# Patient Record
Sex: FEMALE | Race: WHITE
[De-identification: names, ages, dates, MRNs, and addresses within clinical notes are randomized per-mention and may not be internally consistent; named-entity substitution may affect disease eponyms.]

---

## 2020-12-11 ENCOUNTER — HOSPITAL ENCOUNTER (OUTPATIENT)
Dept: HOSPITAL 39 - RAD | Age: 65
End: 2020-12-11
Attending: FAMILY MEDICINE
Payer: MEDICARE

## 2020-12-11 DIAGNOSIS — R91.8: ICD-10-CM

## 2020-12-11 DIAGNOSIS — I70.0: ICD-10-CM

## 2020-12-11 DIAGNOSIS — U07.1: Primary | ICD-10-CM

## 2021-12-01 NOTE — RAD
Both Dr. Brunner and Dr. Fuller are booking out at least 4-6 weeks. Left detailed message to call back. Writer would refer to Lizzie if pt agrees.    EXAM DESCRIPTION: Chest,2 Views



CLINICAL HISTORY: 65 years Female, COVID



COMPARISON: None.



TECHNIQUE: 2 view radiograph of the chest.



IMPRESSION:

Normal size cardiac silhouette. Partially calcified aorta.



Interstitial opacification within the left greater the right mid

to lower lungs and peripheral right upper lung likely

representing multifocal pneumonia or viral infection, given the

patient's history.  No pleural effusion or pneumothorax.



Included osseous structures intact.



Electronically signed by:  Thomas Epps MD  12/13/2020 2:27 PM CST

Workstation: [id number]